# Patient Record
Sex: FEMALE | Race: WHITE | Employment: UNEMPLOYED | URBAN - METROPOLITAN AREA
[De-identification: names, ages, dates, MRNs, and addresses within clinical notes are randomized per-mention and may not be internally consistent; named-entity substitution may affect disease eponyms.]

---

## 2019-06-30 ENCOUNTER — APPOINTMENT (OUTPATIENT)
Dept: CT IMAGING | Age: 74
DRG: 176 | End: 2019-06-30
Attending: PHYSICIAN ASSISTANT
Payer: MEDICARE

## 2019-06-30 ENCOUNTER — HOSPITAL ENCOUNTER (INPATIENT)
Age: 74
LOS: 2 days | Discharge: HOME | DRG: 176 | End: 2019-07-02
Attending: EMERGENCY MEDICINE | Admitting: HOSPITALIST
Payer: MEDICARE

## 2019-06-30 ENCOUNTER — APPOINTMENT (OUTPATIENT)
Dept: GENERAL RADIOLOGY | Age: 74
DRG: 176 | End: 2019-06-30
Attending: EMERGENCY MEDICINE
Payer: MEDICARE

## 2019-06-30 DIAGNOSIS — R60.0 LOWER EXTREMITY EDEMA: ICD-10-CM

## 2019-06-30 DIAGNOSIS — I26.99 BILATERAL PULMONARY EMBOLISM (HCC): Primary | ICD-10-CM

## 2019-06-30 DIAGNOSIS — R07.9 CHEST PAIN, UNSPECIFIED TYPE: ICD-10-CM

## 2019-06-30 LAB
ALBUMIN SERPL-MCNC: 3.7 G/DL (ref 3.4–5)
ALBUMIN/GLOB SERPL: 1.3 {RATIO} (ref 0.8–1.7)
ALP SERPL-CCNC: 81 U/L (ref 45–117)
ALT SERPL-CCNC: 25 U/L (ref 13–56)
ANION GAP SERPL CALC-SCNC: 3 MMOL/L (ref 3–18)
AST SERPL-CCNC: 22 U/L (ref 15–37)
ATRIAL RATE: 64 BPM
BASOPHILS # BLD: 0 K/UL (ref 0–0.1)
BASOPHILS NFR BLD: 1 % (ref 0–2)
BILIRUB SERPL-MCNC: 0.3 MG/DL (ref 0.2–1)
BUN SERPL-MCNC: 25 MG/DL (ref 7–18)
BUN/CREAT SERPL: 24 (ref 12–20)
CALCIUM SERPL-MCNC: 9.2 MG/DL (ref 8.5–10.1)
CALCULATED P AXIS, ECG09: 62 DEGREES
CALCULATED R AXIS, ECG10: 39 DEGREES
CALCULATED T AXIS, ECG11: 38 DEGREES
CHLORIDE SERPL-SCNC: 110 MMOL/L (ref 100–108)
CK MB CFR SERPL CALC: 2.5 % (ref 0–4)
CK MB SERPL-MCNC: 1.8 NG/ML (ref 5–25)
CK SERPL-CCNC: 71 U/L (ref 26–192)
CO2 SERPL-SCNC: 30 MMOL/L (ref 21–32)
CREAT SERPL-MCNC: 1.04 MG/DL (ref 0.6–1.3)
D DIMER PPP FEU-MCNC: 2.1 UG/ML(FEU)
DIAGNOSIS, 93000: NORMAL
DIFFERENTIAL METHOD BLD: ABNORMAL
EOSINOPHIL # BLD: 0.2 K/UL (ref 0–0.4)
EOSINOPHIL NFR BLD: 4 % (ref 0–5)
ERYTHROCYTE [DISTWIDTH] IN BLOOD BY AUTOMATED COUNT: 14.2 % (ref 11.6–14.5)
GLOBULIN SER CALC-MCNC: 2.9 G/DL (ref 2–4)
GLUCOSE SERPL-MCNC: 96 MG/DL (ref 74–99)
HCT VFR BLD AUTO: 42.5 % (ref 35–45)
HGB BLD-MCNC: 13.7 G/DL (ref 12–16)
LYMPHOCYTES # BLD: 1.5 K/UL (ref 0.9–3.6)
LYMPHOCYTES NFR BLD: 25 % (ref 21–52)
MCH RBC QN AUTO: 30.2 PG (ref 24–34)
MCHC RBC AUTO-ENTMCNC: 32.2 G/DL (ref 31–37)
MCV RBC AUTO: 93.8 FL (ref 74–97)
MONOCYTES # BLD: 0.6 K/UL (ref 0.05–1.2)
MONOCYTES NFR BLD: 11 % (ref 3–10)
NEUTS SEG # BLD: 3.5 K/UL (ref 1.8–8)
NEUTS SEG NFR BLD: 59 % (ref 40–73)
P-R INTERVAL, ECG05: 138 MS
PLATELET # BLD AUTO: 191 K/UL (ref 135–420)
PMV BLD AUTO: 10.4 FL (ref 9.2–11.8)
POTASSIUM SERPL-SCNC: 4.4 MMOL/L (ref 3.5–5.5)
PROT SERPL-MCNC: 6.6 G/DL (ref 6.4–8.2)
Q-T INTERVAL, ECG07: 430 MS
QRS DURATION, ECG06: 88 MS
QTC CALCULATION (BEZET), ECG08: 443 MS
RBC # BLD AUTO: 4.53 M/UL (ref 4.2–5.3)
SODIUM SERPL-SCNC: 143 MMOL/L (ref 136–145)
TROPONIN I SERPL-MCNC: <0.02 NG/ML (ref 0–0.04)
VENTRICULAR RATE, ECG03: 64 BPM
WBC # BLD AUTO: 5.9 K/UL (ref 4.6–13.2)

## 2019-06-30 PROCEDURE — 71046 X-RAY EXAM CHEST 2 VIEWS: CPT

## 2019-06-30 PROCEDURE — 85379 FIBRIN DEGRADATION QUANT: CPT

## 2019-06-30 PROCEDURE — 74011250636 HC RX REV CODE- 250/636: Performed by: PHYSICIAN ASSISTANT

## 2019-06-30 PROCEDURE — 96372 THER/PROPH/DIAG INJ SC/IM: CPT

## 2019-06-30 PROCEDURE — 80053 COMPREHEN METABOLIC PANEL: CPT

## 2019-06-30 PROCEDURE — 85025 COMPLETE CBC W/AUTO DIFF WBC: CPT

## 2019-06-30 PROCEDURE — 93005 ELECTROCARDIOGRAM TRACING: CPT

## 2019-06-30 PROCEDURE — 82550 ASSAY OF CK (CPK): CPT

## 2019-06-30 PROCEDURE — 71275 CT ANGIOGRAPHY CHEST: CPT

## 2019-06-30 PROCEDURE — 99284 EMERGENCY DEPT VISIT MOD MDM: CPT

## 2019-06-30 PROCEDURE — 65660000000 HC RM CCU STEPDOWN

## 2019-06-30 PROCEDURE — 74011250637 HC RX REV CODE- 250/637: Performed by: PHYSICIAN ASSISTANT

## 2019-06-30 PROCEDURE — 74011636320 HC RX REV CODE- 636/320: Performed by: EMERGENCY MEDICINE

## 2019-06-30 RX ORDER — ENOXAPARIN SODIUM 150 MG/ML
90 INJECTION SUBCUTANEOUS EVERY 12 HOURS
Status: DISCONTINUED | OUTPATIENT
Start: 2019-06-30 | End: 2019-07-02 | Stop reason: HOSPADM

## 2019-06-30 RX ORDER — OMEPRAZOLE 40 MG/1
40 CAPSULE, DELAYED RELEASE ORAL DAILY
COMMUNITY

## 2019-06-30 RX ORDER — ASPIRIN 81 MG/1
81 TABLET ORAL DAILY
COMMUNITY

## 2019-06-30 RX ORDER — AZELASTINE HCL 205.5 UG/1
2 SPRAY NASAL AS NEEDED
COMMUNITY

## 2019-06-30 RX ORDER — GLUCOSAMINE/CHONDR SU A SOD 750-600 MG
TABLET ORAL DAILY
COMMUNITY

## 2019-06-30 RX ORDER — OMEPRAZOLE 20 MG/1
40 CAPSULE, DELAYED RELEASE ORAL DAILY
Status: DISCONTINUED | OUTPATIENT
Start: 2019-07-01 | End: 2019-07-02 | Stop reason: HOSPADM

## 2019-06-30 RX ORDER — LORATADINE 10 MG/1
10 TABLET ORAL DAILY
COMMUNITY

## 2019-06-30 RX ORDER — ASPIRIN 81 MG/1
81 TABLET ORAL DAILY
Status: DISCONTINUED | OUTPATIENT
Start: 2019-07-01 | End: 2019-07-02 | Stop reason: HOSPADM

## 2019-06-30 RX ORDER — LANOLIN ALCOHOL/MO/W.PET/CERES
1 CREAM (GRAM) TOPICAL 2 TIMES DAILY WITH MEALS
COMMUNITY

## 2019-06-30 RX ORDER — VENLAFAXINE 37.5 MG/1
37.5 TABLET ORAL DAILY
COMMUNITY

## 2019-06-30 RX ORDER — GUAIFENESIN 100 MG/5ML
324 LIQUID (ML) ORAL
Status: COMPLETED | OUTPATIENT
Start: 2019-06-30 | End: 2019-06-30

## 2019-06-30 RX ORDER — LORATADINE 10 MG/1
10 TABLET ORAL DAILY
Status: DISCONTINUED | OUTPATIENT
Start: 2019-07-01 | End: 2019-07-02 | Stop reason: HOSPADM

## 2019-06-30 RX ORDER — BISMUTH SUBSALICYLATE 262 MG
1 TABLET,CHEWABLE ORAL DAILY
COMMUNITY

## 2019-06-30 RX ADMIN — ENOXAPARIN SODIUM 90 MG: 120 INJECTION SUBCUTANEOUS at 21:22

## 2019-06-30 RX ADMIN — ASPIRIN 81 MG 324 MG: 81 TABLET ORAL at 18:25

## 2019-06-30 RX ADMIN — IOPAMIDOL 100 ML: 755 INJECTION, SOLUTION INTRAVENOUS at 20:24

## 2019-06-30 NOTE — ED TRIAGE NOTES
Pt states walking out at [employer] today with family, pt states she had sudden onset of chest pain, denies sob, diaphoresis, nausea , pt states duration approx 5-10 min. Pt states deep breath increased pain . Pt reports hx of same last 2 weeks ago. Pt denies pain at present no cardiac hx.

## 2019-06-30 NOTE — ED PROVIDER NOTES
EMERGENCY DEPARTMENT HISTORY AND PHYSICAL EXAM    Date: 6/30/2019  Patient Name: Vannessa Katz    History of Presenting Illness     Time Seen:6:01 PM      Chief Complaint   Patient presents with    Chest Pain       History Provided By: Patient    Additional History (Context): Vannessa Katz is a 75 y.o. female since emergency room visiting from [place]. Patient was done at [facility] walking around when she started to experience substernal chest discomfort that lasted for about 5 to 10 minutes. Dull ache in her center of her chest.  Nonradiating pain. She had no pain radiated into her neck or down her left arm. No associated shortness of breath or diaphoresis. No nausea. No abdominal pain. She is never had any issues with her heart or lungs. Denies any history of hypertension, diabetes, hyperlipidemia. No family history. Non-smoker. She had a similar incident happened about 2 weeks ago but this 1 lasted a little bit longer. Denies any indigestion or heartburn. She states that she took Zithromax a little over a month ago for an infection and since that time her taste and appetite has just not been the same. Patient is not having any chest pain at this time. No history of blood clots. She did go on some recent long car trips. PCP: UNKNOWN    Current Facility-Administered Medications   Medication Dose Route Frequency Provider Last Rate Last Dose    enoxaparin (LOVENOX) injection 90 mg  90 mg SubCUTAneous Q12H Steve Hairston PA   90 mg at 06/30/19 2122     Current Outpatient Medications   Medication Sig Dispense Refill    vit C/E/Zn/coppr/lutein/zeaxan (PRESERVISION AREDS-2 PO) Take  by mouth daily.  venlafaxine (EFFEXOR) 37.5 mg tablet Take 37.5 mg by mouth daily.  calcium citrate-vitamin d3 (CITRACAL+D) 315-200 mg-unit tab Take 1 Tab by mouth two (2) times daily (with meals).  multivitamin (ONE A DAY) tablet Take 1 Tab by mouth daily.       Biotin 2,500 mcg cap Take  by mouth daily.  aspirin delayed-release 81 mg tablet Take 81 mg by mouth daily.  vit B6-mag cit,oxid-potass cit (THERALITH XR) 3.75-45-45-49.5 mg TbER Take 1 Tab by mouth two (2) times a day.  omeprazole (PRILOSEC) 40 mg capsule Take 40 mg by mouth daily.  mirabegron ER (MYRBETRIQ) 50 mg ER tablet Take 50 mg by mouth daily.  loratadine (CLARITIN) 10 mg tablet Take 10 mg by mouth daily.  azelastine (ASTEPRO) 0.15 % (205.5 mcg) 2 Sprays by Both Nostrils route as needed.  fluticasone propionate 250 mcg/actuation dsdv Take  by inhalation as needed. Past History     Past Medical History:  Past Medical History:   Diagnosis Date    Acid reflux     Kidney stones        Past Surgical History:  Past Surgical History:   Procedure Laterality Date    HX COLONOSCOPY      HX LITHOTRIPSY      HX TONSILLECTOMY      HX TUBAL LIGATION         Family History:  History reviewed. No pertinent family history. Social History:  Social History     Tobacco Use    Smoking status: Never Smoker    Smokeless tobacco: Never Used   Substance Use Topics    Alcohol use: Not Currently    Drug use: Not Currently       Allergies: Allergies   Allergen Reactions    Compazine [Prochlorperazine] Other (comments)     Dystonic reaction     Declomycin [Demeclocycline] Hives    Doxycycline Hives    Penicillins Other (comments)     Not sure passed out      Sulfa (Sulfonamide Antibiotics) Unknown (comments)     As a child           Review of Systems   Review of Systems   Constitutional: Positive for appetite change. Negative for diaphoresis and fatigue. Respiratory: Negative for shortness of breath. Cardiovascular: Positive for chest pain. Negative for palpitations and leg swelling. Gastrointestinal: Negative for abdominal pain. Neurological: Negative for dizziness, syncope, weakness and light-headedness. All other systems reviewed and are negative.       Physical Exam     Vitals:    06/30/19 1742 06/30/19 1930   BP: 138/72    Pulse: 63 61   Resp: 16 20   Temp: 97.5 °F (36.4 °C)    SpO2: 99% 99%   Weight: 90.7 kg (200 lb)    Height: 5' 7\" (1.702 m)      Physical Exam   Constitutional: She is oriented to person, place, and time. Vital signs are normal. She appears well-developed and well-nourished. She is cooperative. She does not appear ill. No distress. Eyes: Pupils are equal, round, and reactive to light. Conjunctivae and EOM are normal.   Neck: Neck supple. No JVD present. No thyromegaly present. Cardiovascular: Normal rate, regular rhythm and normal heart sounds. Pulmonary/Chest: Effort normal. No respiratory distress. She has no wheezes. She has no rales. She exhibits no tenderness. Abdominal: Soft. Bowel sounds are normal. There is no tenderness. Musculoskeletal: She exhibits edema. Scant edema bilateral lower extremities. Minimal pitting. No palpable calf tenderness. Neurological: She is alert and oriented to person, place, and time. Skin: Skin is warm and dry. Psychiatric: She has a normal mood and affect. Nursing note reviewed. Nursing note and vitals reviewed         Diagnostic Study Results     Labs -     Recent Results (from the past 12 hour(s))   CBC WITH AUTOMATED DIFF    Collection Time: 06/30/19  5:30 PM   Result Value Ref Range    WBC 5.9 4.6 - 13.2 K/uL    RBC 4.53 4.20 - 5.30 M/uL    HGB 13.7 12.0 - 16.0 g/dL    HCT 42.5 35.0 - 45.0 %    MCV 93.8 74.0 - 97.0 FL    MCH 30.2 24.0 - 34.0 PG    MCHC 32.2 31.0 - 37.0 g/dL    RDW 14.2 11.6 - 14.5 %    PLATELET 287 187 - 106 K/uL    MPV 10.4 9.2 - 11.8 FL    NEUTROPHILS 59 40 - 73 %    LYMPHOCYTES 25 21 - 52 %    MONOCYTES 11 (H) 3 - 10 %    EOSINOPHILS 4 0 - 5 %    BASOPHILS 1 0 - 2 %    ABS. NEUTROPHILS 3.5 1.8 - 8.0 K/UL    ABS. LYMPHOCYTES 1.5 0.9 - 3.6 K/UL    ABS. MONOCYTES 0.6 0.05 - 1.2 K/UL    ABS. EOSINOPHILS 0.2 0.0 - 0.4 K/UL    ABS.  BASOPHILS 0.0 0.0 - 0.1 K/UL    DF AUTOMATED     CARDIAC PANEL,(CK, CKMB & TROPONIN)    Collection Time: 06/30/19  5:30 PM   Result Value Ref Range    CK 71 26 - 192 U/L    CK - MB 1.8 <3.6 ng/ml    CK-MB Index 2.5 0.0 - 4.0 %    Troponin-I, QT <0.02 0.0 - 2.268 NG/ML   METABOLIC PANEL, COMPREHENSIVE    Collection Time: 06/30/19  5:30 PM   Result Value Ref Range    Sodium 143 136 - 145 mmol/L    Potassium 4.4 3.5 - 5.5 mmol/L    Chloride 110 (H) 100 - 108 mmol/L    CO2 30 21 - 32 mmol/L    Anion gap 3 3.0 - 18 mmol/L    Glucose 96 74 - 99 mg/dL    BUN 25 (H) 7.0 - 18 MG/DL    Creatinine 1.04 0.6 - 1.3 MG/DL    BUN/Creatinine ratio 24 (H) 12 - 20      GFR est AA >60 >60 ml/min/1.73m2    GFR est non-AA 52 (L) >60 ml/min/1.73m2    Calcium 9.2 8.5 - 10.1 MG/DL    Bilirubin, total 0.3 0.2 - 1.0 MG/DL    ALT (SGPT) 25 13 - 56 U/L    AST (SGOT) 22 15 - 37 U/L    Alk. phosphatase 81 45 - 117 U/L    Protein, total 6.6 6.4 - 8.2 g/dL    Albumin 3.7 3.4 - 5.0 g/dL    Globulin 2.9 2.0 - 4.0 g/dL    A-G Ratio 1.3 0.8 - 1.7     D DIMER    Collection Time: 06/30/19  5:30 PM   Result Value Ref Range    D DIMER 2.10 (H) <0.46 ug/ml(FEU)   EKG, 12 LEAD, INITIAL    Collection Time: 06/30/19  5:51 PM   Result Value Ref Range    Ventricular Rate 64 BPM    Atrial Rate 64 BPM    P-R Interval 138 ms    QRS Duration 88 ms    Q-T Interval 430 ms    QTC Calculation (Bezet) 443 ms    Calculated P Axis 62 degrees    Calculated R Axis 39 degrees    Calculated T Axis 38 degrees    Diagnosis       Sinus rhythm with premature atrial complexes  Otherwise normal ECG  Confirmed by Lincoln Pérez MD, Mary Anderson ([id number]) on 6/30/2019 6:04:03 PM         Radiologic Studies   CTA CHEST W OR W WO CONT   Final Result   IMPRESSION:      There are small bilateral lower lobe and right upper lobe pulmonary emboli. There is no right heart strain. Dr. Jen Stevenson informed 9:09 PM June 30, 2019. No aortic dissection. Small hiatal hernia. Large right thyroid lobe nodule. Advise thyroid ultrasound on a nonemergent   basis.       Less than 5 mm multiple pulmonary nodules. Follow-up as per Fleischner Society   protocol.      ========      Fleischner Society Pulmonary Nodule Guidelines (revised 2017): Multiple solid nodules less than 6 mm:   -Low risk for lung cancer: No followup.   -High risk for lung cancer: Optional chest CT in 12 months. XR CHEST PA LAT    (Results Pending)     CT Results  (Last 48 hours)               06/30/19 2036  CTA CHEST W OR W WO CONT Final result    Impression:  IMPRESSION:       There are small bilateral lower lobe and right upper lobe pulmonary emboli. There is no right heart strain. Dr. Mahi Foreman informed 9:09 PM June 30, 2019. No aortic dissection. Small hiatal hernia. Large right thyroid lobe nodule. Advise thyroid ultrasound on a nonemergent   basis. Less than 5 mm multiple pulmonary nodules. Follow-up as per Fleischner Society   protocol.       ========       Fleischner Society Pulmonary Nodule Guidelines (revised 2017): Multiple solid nodules less than 6 mm:   -Low risk for lung cancer: No followup.   -High risk for lung cancer: Optional chest CT in 12 months. Narrative:  EXAM: CTA chest       INDICATION: Chest pain       COMPARISON: None       TECHNIQUE: Axial CT imaging from the thoracic inlet through the diaphragm with   intravenous contrast. Coronal and sagittal MIP reformats were generated. One or   more dose reduction techniques were used on this CT: automated exposure control,   adjustment of the mAs and/or kVp according to patient size, and iterative   reconstruction techniques. The specific techniques used on this CT exam have   been documented in the patient's electronic medical record.  Digital Imaging and   Communications in Medicine (DICOM) format image data are available to   nonaffiliated external healthcare facilities or entities on a secure, media   free, reciprocally searchable basis with patient authorization for at least a 12-month period after this study. _______________       FINDINGS:       EXAM QUALITY: Overall exam quality is satisfactory. Pulmonary arterial   enhancement is adequate with adequate breath hold and no significant artifact. PULMONARY ARTERIES: There are small bilateral lower lobe pulmonary emboli to the   subsegmental pulmonary arteries. Is also small pulmonary emboli to the right   upper lobe pulmonary arteries. LYMPH Nodes: No enlarged lymph nodes seen. THYROID GLAND: There is a very large right thyroid lobe nodule measuring 5.4 x   3.1 cm. Advise thyroid ultrasound for further evaluation. It is causing mass   effect on the trachea. PLEURA: No pleural effusion seen       HEART: Normal without pericardial effusion       VASCULATURE/MEDIASTINUM: There is a moderate size hiatal hernia. There is no   aortic dissection. There is no right heart strain. Right heart strain indices:   RV/LV ratio: Normal (ratio </= 1). Interventricular septal bowing: None. Main pulmonary artery diameter: Normal.   Contrast reflux into the IVC: None. LUNGS: There is a 2 mm nodule in the left upper lobe on image 23. There is a 2   mm nodule left upper lobe image 22. There is a 1 to 2 mm nodule left lower lobe   image 113. There is a 2 mm nodule right upper lobe image 88. No focal airspace   disease seen. AIRWAY: Normal.       UPPER ABDOMEN: There is a nonobstructive calculus in the upper pole the left   kidney measuring 3 mm. Otherwise the visualized solid organs are unremarkable. Moderate size hiatal hernia present. OTHER: No acute or aggressive osseous abnormalities identified. There is   osteopenia and multilevel degenerative disc disease.       _______________               CXR Results  (Last 48 hours)    None            Medical Decision Making   I am the first provider for this patient.     I reviewed the vital signs, available nursing notes, past medical history, past surgical history, family history and social history. Vital Signs-Reviewed the patient's vital signs. Pulse Oximetry Analysis 99% on room air    Records Reviewed: Nursing Notes    DDX: Coronary artery disease, GERD, muscular skeletal, pulmonary/pulmonary embolism    Provider Notes:   75 y.o. female resents emergency room with complaints of chest pain pressure that has now occurred for the second time in 2 weeks. She is here visiting from out of town. Labs x-rays were ordered to better evaluate. EKG as well. EKG showed normal sinus rhythm. No acute changes. Chest x-ray was negative. Laboratory work all came back looking normal except for an elevated d-dimer. For this reason, CTA of the chest was ordered to rule out pulmonary embolism. CTA came back positive for bilateral PEs. Patient was informed of these results. She will be initiated on Lovenox. Phone consultation was placed to Dr. Heraclio Garrett, hospitalist.  He was advised of the patient's condition and has agreed to admit the patient to the hospital for further evaluation and treatment. Procedures:  Procedures    ED Course:   Initial assessment performed. The patients presenting problems have been discussed, and they are in agreement with the care plan formulated and outlined with them. I have encouraged them to ask questions as they arise throughout their visit. Diagnosis and Disposition       Critical Care Time: 30 minutes    Core Measures:  For Hospitalized Patients:    1. Hospitalization Decision Time:  The decision to hospitalize the patient was made by Marcel Lozoya at 9:30 PM   on 6/30/2019    2. Aspirin: Aspirin was given    9:29 PM  Patient is [facility] [address] [place] [address]. The results of their tests and reasons for their admission have been discussed with them and/or available family. They convey agreement and understanding for the need to be admitted and for their admission diagnosis.       CONDITIONS ON ADMISSION:  Sepsis is not present at the time of admission. Deep Vein Thrombosis is not present at the time of admission. Thrombosis is present at the time of admission. Urinary Tract Infection is not present at the time of admission. Pneumonia is not present at the time of admission. MRSA is not present at the time of admission. Wound infection   Is not present at the time of admission. Pressure Ulcer is not present at the time of admission. CLINICAL IMPRESSION:    1. Bilateral pulmonary embolism (HCC)    2. Chest pain, unspecified type    3. Lower extremity edema      PLAN:  1. Admit to Telemetry - Dr. Amol Keys    Please note that this dictation was completed with [employer], the computer voice recognition software. Quite often unanticipated grammatical, syntax, homophones, and other interpretive errors are inadvertently transcribed by the computer software. Please disregard these errors. Please excuse any errors that have escaped final proofreading.

## 2019-07-01 ENCOUNTER — APPOINTMENT (OUTPATIENT)
Dept: VASCULAR SURGERY | Age: 74
DRG: 176 | End: 2019-07-01
Attending: HOSPITALIST
Payer: MEDICARE

## 2019-07-01 ENCOUNTER — APPOINTMENT (OUTPATIENT)
Dept: NON INVASIVE DIAGNOSTICS | Age: 74
DRG: 176 | End: 2019-07-01
Attending: HOSPITALIST
Payer: MEDICARE

## 2019-07-01 LAB
ANION GAP SERPL CALC-SCNC: 5 MMOL/L (ref 3–18)
BUN SERPL-MCNC: 21 MG/DL (ref 7–18)
BUN/CREAT SERPL: 23 (ref 12–20)
CALCIUM SERPL-MCNC: 8.7 MG/DL (ref 8.5–10.1)
CHLORIDE SERPL-SCNC: 108 MMOL/L (ref 100–108)
CO2 SERPL-SCNC: 28 MMOL/L (ref 21–32)
CREAT SERPL-MCNC: 0.92 MG/DL (ref 0.6–1.3)
ECHO AO ASC DIAM: 2.95 CM
ECHO AO ROOT DIAM: 2.78 CM
ECHO AV MEAN GRADIENT: 6 MMHG
ECHO AV PEAK GRADIENT: 9.6 MMHG
ECHO AV PEAK VELOCITY: 154.95 CM/S
ECHO AV VTI: 33.88 CM
ECHO LA MAJOR AXIS: 4.03 CM
ECHO LA VOL 2C: 71.66 ML (ref 22–52)
ECHO LA VOL 4C: 48.46 ML (ref 22–52)
ECHO LA VOL BP: 68.35 ML (ref 22–52)
ECHO LA VOL/BSA BIPLANE: 33.8 ML/M2 (ref 16–28)
ECHO LA VOLUME INDEX A2C: 35.44 ML/M2 (ref 16–28)
ECHO LA VOLUME INDEX A4C: 23.97 ML/M2 (ref 16–28)
ECHO LV E' LATERAL VELOCITY: 7 CM/S
ECHO LV E' SEPTAL VELOCITY: 7 CM/S
ECHO LV EDV A2C: 69.8 ML
ECHO LV EDV A4C: 84.2 ML
ECHO LV EDV BP: 85.3 ML (ref 56–104)
ECHO LV EDV INDEX A4C: 41.6 ML/M2
ECHO LV EDV INDEX BP: 42.2 ML/M2
ECHO LV EDV NDEX A2C: 34.5 ML/M2
ECHO LV EJECTION FRACTION A2C: 63 %
ECHO LV EJECTION FRACTION A4C: 58 %
ECHO LV EJECTION FRACTION BIPLANE: 61.4 % (ref 55–100)
ECHO LV ESV A2C: 25.6 ML
ECHO LV ESV A4C: 35.7 ML
ECHO LV ESV BP: 32.9 ML (ref 19–49)
ECHO LV ESV INDEX A2C: 12.7 ML/M2
ECHO LV ESV INDEX A4C: 17.7 ML/M2
ECHO LV ESV INDEX BP: 16.3 ML/M2
ECHO LV INTERNAL DIMENSION DIASTOLIC: 4.14 CM (ref 3.9–5.3)
ECHO LV INTERNAL DIMENSION SYSTOLIC: 2.57 CM
ECHO LV IVSD: 1.19 CM (ref 0.6–0.9)
ECHO LV MASS 2D: 172.1 G (ref 67–162)
ECHO LV MASS INDEX 2D: 85.1 G/M2 (ref 43–95)
ECHO LV POSTERIOR WALL DIASTOLIC: 0.97 CM (ref 0.6–0.9)
ECHO LVOT DIAM: 1.92 CM
ECHO MV A VELOCITY: 107.18 CM/S
ECHO MV AREA PHT: 3.2 CM2
ECHO MV E DECELERATION TIME (DT): 236.9 MS
ECHO MV E VELOCITY: 91.74 CM/S
ECHO MV E/A RATIO: 0.86
ECHO MV E/E' LATERAL: 13.11
ECHO MV E/E' RATIO (AVERAGED): 13.11
ECHO MV E/E' SEPTAL: 13.11
ECHO MV PRESSURE HALF TIME (PHT): 68.7 MS
ECHO PV MAX VELOCITY: 115.36 CM/S
ECHO PV PEAK GRADIENT: 5.3 MMHG
ECHO RA AREA 4C: 16.05 CM2
ECHO RV INTERNAL DIMENSION: 3.53 CM
ECHO TRICUSPID ANNULAR PEAK SYSTOLIC VELOCITY: 2 CM/S
ECHO TV REGURGITANT MAX VELOCITY: 284.43 CM/S
ECHO TV REGURGITANT PEAK GRADIENT: 32.4 MMHG
ERYTHROCYTE [DISTWIDTH] IN BLOOD BY AUTOMATED COUNT: 14 % (ref 11.6–14.5)
GLUCOSE SERPL-MCNC: 113 MG/DL (ref 74–99)
HCT VFR BLD AUTO: 42 % (ref 35–45)
HGB BLD-MCNC: 13.3 G/DL (ref 12–16)
MCH RBC QN AUTO: 29.6 PG (ref 24–34)
MCHC RBC AUTO-ENTMCNC: 31.7 G/DL (ref 31–37)
MCV RBC AUTO: 93.5 FL (ref 74–97)
PLATELET # BLD AUTO: 189 K/UL (ref 135–420)
PMV BLD AUTO: 10.2 FL (ref 9.2–11.8)
POTASSIUM SERPL-SCNC: 3.8 MMOL/L (ref 3.5–5.5)
RBC # BLD AUTO: 4.49 M/UL (ref 4.2–5.3)
SODIUM SERPL-SCNC: 141 MMOL/L (ref 136–145)
WBC # BLD AUTO: 5.3 K/UL (ref 4.6–13.2)

## 2019-07-01 PROCEDURE — 74011250637 HC RX REV CODE- 250/637: Performed by: HOSPITALIST

## 2019-07-01 PROCEDURE — 80048 BASIC METABOLIC PNL TOTAL CA: CPT

## 2019-07-01 PROCEDURE — 97535 SELF CARE MNGMENT TRAINING: CPT

## 2019-07-01 PROCEDURE — 36415 COLL VENOUS BLD VENIPUNCTURE: CPT

## 2019-07-01 PROCEDURE — 97116 GAIT TRAINING THERAPY: CPT

## 2019-07-01 PROCEDURE — 85027 COMPLETE CBC AUTOMATED: CPT

## 2019-07-01 PROCEDURE — 97161 PT EVAL LOW COMPLEX 20 MIN: CPT

## 2019-07-01 PROCEDURE — 74011250636 HC RX REV CODE- 250/636: Performed by: HOSPITALIST

## 2019-07-01 PROCEDURE — 93970 EXTREMITY STUDY: CPT

## 2019-07-01 PROCEDURE — 97166 OT EVAL MOD COMPLEX 45 MIN: CPT

## 2019-07-01 PROCEDURE — 65660000000 HC RM CCU STEPDOWN

## 2019-07-01 PROCEDURE — C8929 TTE W OR WO FOL WCON,DOPPLER: HCPCS

## 2019-07-01 PROCEDURE — 74011250636 HC RX REV CODE- 250/636: Performed by: PHYSICIAN ASSISTANT

## 2019-07-01 RX ADMIN — ENOXAPARIN SODIUM 90 MG: 120 INJECTION SUBCUTANEOUS at 10:20

## 2019-07-01 RX ADMIN — ENOXAPARIN SODIUM 90 MG: 120 INJECTION SUBCUTANEOUS at 21:44

## 2019-07-01 RX ADMIN — ASPIRIN 81 MG: 81 TABLET, COATED ORAL at 20:00

## 2019-07-01 RX ADMIN — PERFLUTREN 1 ML: 6.52 INJECTION, SUSPENSION INTRAVENOUS at 10:13

## 2019-07-01 RX ADMIN — OMEPRAZOLE 40 MG: 20 CAPSULE, DELAYED RELEASE ORAL at 17:41

## 2019-07-01 RX ADMIN — MIRABEGRON 50 MG: 25 TABLET, FILM COATED, EXTENDED RELEASE ORAL at 20:02

## 2019-07-01 NOTE — ED NOTES
TRANSFER - OUT REPORT:    Verbal report given to Elina Peace RN (name) on Daisy Hernandez  being transferred to tele (unit) for routine progression of care       Report consisted of patients Situation, Background, Assessment and   Recommendations(SBAR). Information from the following report(s) SBAR, Kardex, ED Summary, Procedure Summary and Intake/Output was reviewed with the receiving nurse. Lines:   Peripheral IV 06/30/19 Left Antecubital (Active)        Opportunity for questions and clarification was provided.       Patient transported with:   Monitor

## 2019-07-01 NOTE — PROGRESS NOTES
Problem: Self Care Deficits Care Plan (Adult)  Goal: *Acute Goals and Plan of Care (Insert Text)  Description  Initial Occupational Therapy Goals (7/1/2019) Within 7 day(s):    1. Patient will perform grooming standing at sink with Supervision x 2-3 minutes for increased independence with ADLs. 2. Patient will perform UB dressing with setup for increased independence with ADLs. 3. Patient will perform LB dressing with setup for increased independence with ADLs. 4. Patient will perform all aspects of toileting with Supervision for increased independence in ADLs  5. Patient will independently apply energy conservation techniques with 1 verbal cue(s) for increased independence with ADLs. 6. Patient will utilize good body mechanics during ADLs with 1 verbal cue(s). Outcome: Resolved/Met     OCCUPATIONAL THERAPY EVALUATION/DISCHARGE    Patient: Dionne Garcia (38 y.o. female)  Date: 7/1/2019  Primary Diagnosis: Bilateral pulmonary embolism ([facility] [address] [I26.99]  Chest pain [R07.9]        Precautions: blood thinners; PE     PLOF: Pt was independent; pt drove w/ spouse and to take grandsons to this area to visit [address] [place] [address], etc. Pt lives outside of [facility]:  Based on the objective data described below, the patient presents with near baseline. SPO2 maintained w/ mild decrease during LE dressing w/ quick recovery from 88% to 94% (within ~<30 seconds). Education on frequent stops on travels and use of compression vs. Ankle pumps in car. Education: Patient instructed on home safety, body mechanics for optimal respiratory effort, Energy Conservation/Work Simplification Techniques, adaptive strategies and adaptive dressing techniques including clothing modifications with patient verbalizing understanding at this time. Skilled Occupational Therapy is not indicated at this time.   Discharge Recommendations: None  Further Equipment Recommendations for Discharge: N/A SUBJECTIVE:   Patient stated ? Should I stop and visit sites along the way? .?    OBJECTIVE DATA SUMMARY:     Past Medical History:   Diagnosis Date    Acid reflux     Kidney stones      Past Surgical History:   Procedure Laterality Date    HX COLONOSCOPY      HX LITHOTRIPSY      HX TONSILLECTOMY      HX TUBAL LIGATION       Barriers to Learning/Limitations: yes;  physical  Compensate with: visual, verbal, tactile, kinesthetic cues/model    Home Situation: Supportive spouse  Home Situation  Home Environment: Private residence  Wheelchair Ramp: Yes  One/Two Story Residence: Two story  # of Interior Steps: 12(with landing midway)  Interior Rails: Left(ascending)  Living Alone: No  Support Systems: Spouse/Significant Other/Partner  Patient Expects to be Discharged to[de-identified] Private residence  Current DME Used/Available at Home: None  Tub or Shower Type: Tub/Shower combination  ? Right hand dominant   ? Left hand dominant    Cognitive/Behavioral Status:  Neurologic State: Alert; Appropriate for age  Orientation Level: Appropriate for age;Oriented X4  Cognition: Appropriate decision making; Appropriate for age attention/concentration; Appropriate safety awareness; Follows commands  Safety/Judgement: Awareness of environment;Driving appropriateness; Insight into deficits    Skin: appears grossly intact   Edema: No significant edema noted     Vision/Perceptual:    WFL   Corrective Lenses: Reading glasses    Coordination: BUE  Coordination: Within functional limits  Fine Motor Skills-Upper: Left Intact; Right Intact    Gross Motor Skills-Upper: Left Intact; Right Intact    Balance:  Sitting: Intact  Standing: Intact; Without support    Strength: BUE  Strength:  Within functional limits    Tone & Sensation: BUE  Tone: Normal  Sensation: Intact    Range of Motion: BUE  AROM: Within functional limits    Functional Mobility and Transfers for ADLs:  Bed Mobility:   Independent  Transfers:  Sit to Stand: Independent  Bed to Chair: Independent   Toilet Transfer : Independent   Bathroom Mobility: Independent    ADL Assessment:  Feeding: Independent    Oral Facial Hygiene/Grooming: Independent    Bathing: Independent    Upper Body Dressing: Independent    Lower Body Dressing: Independent    Toileting: Independent    ADL Intervention:  Grooming  Washing Hands: Independent    Lower Body Dressing Assistance  Socks: Independent  Pt reports Knee ROM deficits affecting LE sock frances/doffing, but able to complete    Toileting  Toileting Assistance: Independent  Bladder Hygiene: Independent  Clothing Management: Independent    Cognitive Retraining  Safety/Judgement: Awareness of environment;Driving appropriateness; Insight into deficits    Pain:  Pain level pre-treatment: 0/10   Pain level post-treatment: 0/10   Pain Intervention(s): Medication provided by Nursing (see MAR); Rest, Ice, Repositioning   Response to intervention: Nurse notified, See doc flow sheet    Activity Tolerance:   Fair. Patient able to stand 2-3 minute(s). Patient able to complete ADLs with intermittent rest breaks. Patient limited by functional endurance, pulmonary status. Please refer to the flowsheet for vital signs taken during this treatment. After treatment:   ?  Patient left in no apparent distress sitting up in chair  ? Patient left in no apparent distress in bed/sitting EOB  ? Call bell left within reach  ? Nursing notified  ? Caregiver present/PT/Jese  ? Bed alarm activated    COMMUNICATION/EDUCATION:   ?      Role of Occupational Therapy in the acute care setting  ? Home safety education was provided and the patient/caregiver indicated understanding. ? Patient/family have participated as able and agree with findings and recommendations. ?      Patient is unable to participate in plan of care at this time.     Thank you for this referral.  Jorge Evangelist  Time Calculation: 29 mins      Eval Complexity: History: HIGH Complexity : Extensive review of history including physical, cognitive and psychosocial history ; Examination: MEDIUM Complexity : 3-5 performance deficits relating to physical, cognitive , or psychosocial skils that result in activity limitations and / or participation restrictions; Decision Making:MEDIUM Complexity : Patient may present with comorbidities that affect occupational performnce.  Miniml to moderate modification of tasks or assistance (eg, physical or verbal ) with assesment(s) is necessary to enable patient to complete evaluation

## 2019-07-01 NOTE — PROGRESS NOTES
Problem: Falls - Risk of  Goal: *Absence of Falls  Description  Document Nelson Boeck Fall Risk and appropriate interventions in the flowsheet.   Outcome: Progressing Towards Goal     Problem: Patient Education: Go to Patient Education Activity  Goal: Patient/Family Education  Outcome: Progressing Towards Goal     Problem: Patient Education: Go to Patient Education Activity  Goal: Patient/Family Education  Outcome: Progressing Towards Goal     Problem: Patient Education: Go to Patient Education Activity  Goal: Patient/Family Education  Outcome: Progressing Towards Goal

## 2019-07-01 NOTE — ED NOTES
Attempted to call report to receiving RN. RN unable to take report at this time will re attempted to call report shortly.

## 2019-07-01 NOTE — PROGRESS NOTES
0730 Assumed responsibility for patient from Kecia Vides RN    56 Patient assessment completed. Patient stated she had already taken her meds from home. Cautioned patient that she should not take home meds while in hospital as they cannot be documented and could cause duplicate dosing to happen. Patient understood. Marlen Harris aware. Bedside shift change report given to Ryan Smith RN (oncoming nurse) by Hortensia Nichols RN (offgoing nurse). Report included the following information SBAR, Kardex and MAR.

## 2019-07-01 NOTE — PROGRESS NOTES
Bedside shift change report given to Cherie Echavarria (oncoming nurse) by Ravinder Doan   (offgoing nurse). Report included the following information SBAR, Kardex, Intake/Output and MAR.

## 2019-07-01 NOTE — H&P
History & Physical    Patient: Mariam Enriquez MRN: [id number]  CSN: [id number]    YOB: 1945  Age: 75 y.o. Sex: female      DOA: 6/30/2019  Primary Care Provider:  UNKNOWN      Assessment/Plan     Patient Active Problem List   Diagnosis Code    Chest pain R07.9    Bilateral pulmonary embolism ([facility] [address] I26.99       Admit to medical floor. Bilateral pulmonary embolism - on lovenox therapeutic dose, will check LE venous dopplers and echo to look for right heart strain. Discussed with patient of choice of anticoagulation, discussed coumadin vs NOAC. She would like to use NOAC. Will continue with PPI for GERD. Estimated length of stay : 1-2 days. CC: chest pain       HPI:     Mariam Enriquez is a 75 y.o. female who has no significant past history except for kidney stones, presents to ER with concerns of chest pain. Patient reports that she was walking at [facility] when she felt chest pain that was sharp located center of chest. Not associated with SOB or diaphoresis. It lasted about 5 mins. Denies any syncopal episode, leg swelling or pain. She is visiting this area and she came from [place], [place]. She had long 7-8 hours drive 4 days ago. She reports that 2 weeks ago she drove to [place], [place] and at that time she had similar chest pain but did not seek medical attention. In ER CT chest with bilateral PE, no right heart strain. Past Medical History:   Diagnosis Date    Acid reflux     Kidney stones        Past Surgical History:   Procedure Laterality Date    HX COLONOSCOPY      HX LITHOTRIPSY      HX TONSILLECTOMY      HX TUBAL LIGATION         History reviewed. No pertinent family history.     Social History     Socioeconomic History    Marital status:      Spouse name: Not on file    Number of children: Not on file    Years of education: Not on file    Highest education level: Not on file   Tobacco Use    Smoking status: Never Smoker    Smokeless tobacco: Never Used   Substance and Sexual Activity    Alcohol use: Not Currently    Drug use: Not Currently       Prior to Admission medications    Medication Sig Start Date End Date Taking? Authorizing Provider   vit C/E/Zn/coppr/lutein/zeaxan (PRESERVISION AREDS-2 PO) Take  by mouth daily. Yes Other, MD Yulia   venlafaxine (EFFEXOR) 37.5 mg tablet Take 37.5 mg by mouth daily. Yes Amadeo, MD Yulia   calcium citrate-vitamin d3 (CITRACAL+D) 315-200 mg-unit tab Take 1 Tab by mouth two (2) times daily (with meals). Yes Other, MD Yulia   multivitamin (ONE A DAY) tablet Take 1 Tab by mouth daily. Yes Amadeo, MD Yulia   Biotin 2,500 mcg cap Take  by mouth daily. Yes Amadeo, MD Yulia   aspirin delayed-release 81 mg tablet Take 81 mg by mouth daily. Yes Amadeo, MD Yulia   vit B6-mag cit,oxid-potass cit (THERALITH XR) 3.75-45-45-49.5 mg TbER Take 1 Tab by mouth two (2) times a day. Yes Amadeo, MD Yulia   omeprazole (PRILOSEC) 40 mg capsule Take 40 mg by mouth daily. Yes Amadeo, MD Yulia   mirabegron ER (MYRBETRIQ) 50 mg ER tablet Take 50 mg by mouth daily. Yes Amadeo, MD Yulia   loratadine (CLARITIN) 10 mg tablet Take 10 mg by mouth daily. Yes Amadeo, MD Yulia   azelastine (ASTEPRO) 0.15 % (205.5 mcg) 2 Sprays by Both Nostrils route as needed. Yes Amadeo, MD Yulia   fluticasone propionate 250 mcg/actuation dsdv Take  by inhalation as needed. Yes Other, MD Yulia       Allergies   Allergen Reactions    Compazine [Prochlorperazine] Other (comments)     Dystonic reaction     Declomycin [Demeclocycline] Hives    Doxycycline Hives    Penicillins Other (comments)     Not sure passed out      Sulfa (Sulfonamide Antibiotics) Unknown (comments)     As a child         Review of Systems  Gen: No fever, chills, malaise, weight loss/gain. Heent: No headache, rhinorrhea, epistaxis, ear pain, hearing loss, sinus pain, neck pain/stiffness, sore throat.    Heart: see above   Resp: No cough, hemoptysis, wheezing and shortness of breath. GI: No nausea, vomiting, diarrhea, constipation, melena or hematochezia. : No urinary obstruction, dysuria or hematuria. Derm: No rash, new skin lesion or pruritis. Musc/skeletal: no bone or joint complains. Vasc: No edema, cyanosis or claudication. Endo: No heat/cold intolerance, no polyuria,polydipsia or polyphagia. Neuro: No unilateral weakness, numbness, tingling. No seizures. Heme: No easy bruising or bleeding. Physical Exam:     Physical Exam:  Visit Vitals  /67 (BP 1 Location: Left arm)   Pulse 62   Temp 98 °F (36.7 °C)   Resp 14   Ht 5' 7\" (1.702 m)   Wt 90.7 kg (200 lb)   LMP  (LMP Unknown)   SpO2 100%   BMI 31.32 kg/m²      O2 Device: Room air    Temp (24hrs), Av.8 °F (36.6 °C), Min:97.5 °F (36.4 °C), Max:98 °F (36.7 °C)    No intake/output data recorded. No intake/output data recorded. General:  Awake, cooperative, no distress. Head:  Normocephalic, without obvious abnormality, atraumatic. Eyes:  Conjunctivae/corneas clear, sclera anicteric, PERRL, EOMs intact. Nose: Nares normal. No drainage or sinus tenderness. Throat: Lips, mucosa, and tongue normal.    Neck: Supple, symmetrical, trachea midline, no adenopathy. Lungs:   Clear to auscultation bilaterally. Heart:   S1, S2, no murmur, click, rub or gallop. Abdomen: Soft, non-tender. Bowel sounds normal. No masses,  No organomegaly. Extremities: Extremities normal, atraumatic, no cyanosis or edema. Capillary refill normal.   Pulses: 2+ and symmetric all extremities. Skin: Skin color pink, turgor normal. No rashes or lesions   Neurologic: CNII-XII intact. No focal motor or sensory deficit.        Labs Reviewed:    CMP:   Lab Results   Component Value Date/Time     2019 05:30 PM    K 4.4 2019 05:30 PM     (H) 2019 05:30 PM    CO2 30 2019 05:30 PM    AGAP 3 2019 05:30 PM    GLU 96 2019 05:30 PM    BUN 25 (H) 2019 05:30 PM    CREA 1.04 06/30/2019 05:30 PM    GFRAA >60 06/30/2019 05:30 PM    GFRNA 52 (L) 06/30/2019 05:30 PM    CA 9.2 06/30/2019 05:30 PM    ALB 3.7 06/30/2019 05:30 PM    TP 6.6 06/30/2019 05:30 PM    GLOB 2.9 06/30/2019 05:30 PM    AGRAT 1.3 06/30/2019 05:30 PM    SGOT 22 06/30/2019 05:30 PM    ALT 25 06/30/2019 05:30 PM     CBC:   Lab Results   Component Value Date/Time    WBC 5.9 06/30/2019 05:30 PM    HGB 13.7 06/30/2019 05:30 PM    HCT 42.5 06/30/2019 05:30 PM     06/30/2019 05:30 PM         Procedures/imaging: see electronic medical records for all procedures/Xrays and details which were not copied into this note but were reviewed prior to creation of Plan        CC: UNKNOWN

## 2019-07-01 NOTE — PROGRESS NOTES
Hospitalist Progress Note    Patient: John Romero MRN: [id number]  CSN: [id number]    YOB: 1945  Age: 75 y.o. Sex: female    DOA: 6/30/2019 LOS:  LOS: 1 day                Assessment/Plan     Patient Active Problem List   Diagnosis Code    Chest pain R07.9    Bilateral pulmonary embolism ([facility] [address] I26.99            76 yo female admitted for PE    Bilateral pulmonary embolism - on lovenox therapeutic dose, follow LE venous dopplers and echo to look for right heart strain. Discussed with patient of choice of anticoagulation, discussed coumadin vs NOAC. She would like to use NOAC.      Will continue with PPI for GERD. Disposition : 1-2 days    Review of systems  General: No fevers or chills. Cardiovascular: No chest pain or pressure. No palpitations. Pulmonary: No shortness of breath. Gastrointestinal: No nausea, vomiting. Physical Exam:  General: Awake, cooperative, no acute distress    HEENT: NC, Atraumatic. PERRLA, anicteric sclerae. Lungs: CTA Bilaterally. No Wheezing/Rhonchi/Rales. Heart:  S1 S2,  No murmur, No Rubs, No Gallops  Abdomen: Soft, Non distended, Non tender.  +Bowel sounds,   Extremities: No c/c/e  Psych:   Not anxious or agitated. Neurologic:  No acute neurological deficit. Vital signs/Intake and Output:  Visit Vitals  /61   Pulse 68   Temp 97.4 °F (36.3 °C)   Resp 14   Ht 5' 7\" (1.702 m)   Wt 89.7 kg (197 lb 12.8 oz)   SpO2 100%   BMI 30.98 kg/m²     Current Shift:  No intake/output data recorded. Last three shifts:  No intake/output data recorded.             Labs: Results:       Chemistry Recent Labs     07/01/19  0106 06/30/19  1730   * 96    143   K 3.8 4.4    110*   CO2 28 30   BUN 21* 25*   CREA 0.92 1.04   CA 8.7 9.2   AGAP 5 3   BUCR 23* 24*   AP  --  81   TP  --  6.6   ALB  --  3.7   GLOB  --  2.9   AGRAT  --  1.3      CBC w/Diff Recent Labs     07/01/19  0106 06/30/19  1730   WBC 5.3 5.9   RBC 4.49 4.53   HGB 13.3 13.7   HCT 42.0 42.5    191   GRANS  --  59   LYMPH  --  25   EOS  --  4      Cardiac Enzymes Recent Labs     06/30/19  1730   CPK 71   CKND1 2.5      Coagulation No results for input(s): PTP, INR, APTT in the last 72 hours. No lab exists for component: INREXT    Lipid Panel No results found for: CHOL, CHOLPOCT, CHOLX, CHLST, CHOLV, 719084, HDL, LDL, LDLC, DLDLP, 926948, VLDLC, VLDL, TGLX, TRIGL, TRIGP, TGLPOCT, CHHD, CHHDX   BNP No results for input(s): BNPP in the last 72 hours.    Liver Enzymes Recent Labs     06/30/19  1730   TP 6.6   ALB 3.7   AP 81   SGOT 22      Thyroid Studies No results found for: T4, T3U, TSH, TSHEXT     Procedures/imaging: see electronic medical records for all procedures/Xrays and details which were not copied into this note but were reviewed prior to creation of Plan

## 2019-07-01 NOTE — PROGRESS NOTES
Problem: Mobility Impaired (Adult and Pediatric)  Goal: *Acute Goals and Plan of Care (Insert Text)  Description  No goals necessary at this time as pt demonstrates functional mobility with independence. Outcome: Resolved/Met    PHYSICAL THERAPY EVALUATION & DISCHARGE    Patient: Milvia Lopez (42 y.o. female)  Date: 7/1/2019  Primary Diagnosis: Bilateral pulmonary embolism (HCC) [I26.99]  Chest pain [R07.9]  Precautions:  ASSESSMENT AND RECOMMENDATIONS:  Based on the objective data described below, the patient presents with ability to perform functional mobility tasks at independence level without AD, including ambulation level surface 350ft and negotiation of flight of steps with L HR ascending/R HR descending (as at home). No gait deviations noted. O2 sat 98% pre, 97% post activity. Pt left up in chair at bedside with all needs in reach. No further skilled PT intervention indicated. Note pt was visiting area from [place] with spouse and grandchildren when current episode occurred. Discussed pacing self  and building additional rest breaks into drive back home - at  perhaps 2 hr intervals, as well as changing to 2 day trip, vs 1 day. Nurse Coleman Loving notified of above. Skilled physical therapy is not indicated at this time. Discharge Recommendations: None  Further Equipment Recommendations for Discharge: N/A      SUBJECTIVE:   Patient stated ? I'm doing fine. ?    OBJECTIVE DATA SUMMARY:     Past Medical History:   Diagnosis Date    Acid reflux     Kidney stones      Past Surgical History:   Procedure Laterality Date    HX COLONOSCOPY      HX LITHOTRIPSY      HX TONSILLECTOMY      HX TUBAL LIGATION       Barriers to Learning/Limitations: None  Compensate with: visual, verbal, tactile, kinesthetic cues/model  Prior Level of Function/Home Situation: independent w/o AD PTA; was here visiting area from [address] [place] [address] Place: Private residence  Wheelchair Ramp: Yes  One/Two Story Residence: Two story  # of Interior Steps: 12(with landing midway)  Interior Rails: Left(ascending)  Living Alone: No  Support Systems: Spouse/Significant Other/Partner  Patient Expects to be Discharged to[de-identified] Private residence  Current DME Used/Available at Home: None  Tub or Shower Type: Tub/Shower combination  Critical Behavior:  Neurologic State: Appropriate for age  Orientation Level: Oriented X4  Cognition: Appropriate safety awareness; Appropriate for age attention/concentration; Appropriate decision making  Safety/Judgement: Awareness of environment; Fall prevention  Psychosocial  Patient Behaviors: Calm; Cooperative  Purposeful Interaction: Yes  Pt Identified Daily Priority: Clinical issues (comment)  Katy Process: Establish trust  Caring Interventions: Reassure  Reassure: Informing  Strength:    Strength: Within functional limits  Tone & Sensation:   Tone: Normal  Sensation: Intact  Range Of Motion:  AROM: Within functional limits  Functional Mobility:  Bed Mobility:  Transfers:  Sit to Stand: Independent  Stand to Sit: Independent  Bed to Chair: Independent  Balance:   Sitting: Intact  Standing: Intact; Without support  Ambulation/Gait Training:  Distance (ft): 350 Feet (ft)  Assistive Device: Gait belt  Ambulation - Level of Assistance: Independent  Gait Description (WDL): Exceptions to WDL  Interventions: Safety awareness training  Pain:  Pain Scale 1: Numeric (0 - 10)  Pain Intensity 1: 0  Activity Tolerance:   Good   Please refer to the flowsheet for vital signs taken during this treatment. After treatment:   ?         Patient left in no apparent distress sitting up in chair  ? Patient left in no apparent distress in bed  ? Call bell left within reach  ? Nursing notified-Gus  ? Caregiver present  ? Bed alarm activated    COMMUNICATION/EDUCATION:   ?         Fall prevention education was provided and the patient/caregiver indicated understanding.   ? Patient/family have participated as able in goal setting and plan of care. ?         Patient/family agree to work toward stated goals and plan of care. ?         Patient understands intent and goals of therapy, but is neutral about his/her participation. ? Patient is unable to participate in goal setting and plan of care.     Eval Complexity: History: MEDIUM  Complexity : 1-2 comorbidities / personal factors will impact the outcome/ POC Exam:LOW Complexity : 1-2 Standardized tests and measures addressing body structure, function, activity limitation and / or participation in recreation  Presentation: LOW Complexity : Stable, uncomplicated  Clinical Decision Making:Low Complexity amb 350ft with independence, no deviations  Overall Complexity:LOW     Thank you for this referral.  Sherif White, PT   Time Calculation: 23 mins

## 2019-07-02 VITALS
BODY MASS INDEX: 31.63 KG/M2 | HEIGHT: 67 IN | DIASTOLIC BLOOD PRESSURE: 70 MMHG | TEMPERATURE: 97.9 F | SYSTOLIC BLOOD PRESSURE: 107 MMHG | OXYGEN SATURATION: 98 % | RESPIRATION RATE: 16 BRPM | WEIGHT: 201.5 LBS | HEART RATE: 79 BPM

## 2019-07-02 LAB
ANION GAP SERPL CALC-SCNC: 6 MMOL/L (ref 3–18)
BUN SERPL-MCNC: 19 MG/DL (ref 7–18)
BUN/CREAT SERPL: 22 (ref 12–20)
CALCIUM SERPL-MCNC: 8.4 MG/DL (ref 8.5–10.1)
CHLORIDE SERPL-SCNC: 107 MMOL/L (ref 100–108)
CO2 SERPL-SCNC: 29 MMOL/L (ref 21–32)
CREAT SERPL-MCNC: 0.87 MG/DL (ref 0.6–1.3)
ERYTHROCYTE [DISTWIDTH] IN BLOOD BY AUTOMATED COUNT: 14.1 % (ref 11.6–14.5)
GLUCOSE SERPL-MCNC: 104 MG/DL (ref 74–99)
HCT VFR BLD AUTO: 43.4 % (ref 35–45)
HGB BLD-MCNC: 13.9 G/DL (ref 12–16)
MCH RBC QN AUTO: 30 PG (ref 24–34)
MCHC RBC AUTO-ENTMCNC: 32 G/DL (ref 31–37)
MCV RBC AUTO: 93.7 FL (ref 74–97)
PLATELET # BLD AUTO: 176 K/UL (ref 135–420)
PMV BLD AUTO: 10.3 FL (ref 9.2–11.8)
POTASSIUM SERPL-SCNC: 3.9 MMOL/L (ref 3.5–5.5)
RBC # BLD AUTO: 4.63 M/UL (ref 4.2–5.3)
SODIUM SERPL-SCNC: 142 MMOL/L (ref 136–145)
WBC # BLD AUTO: 4.9 K/UL (ref 4.6–13.2)

## 2019-07-02 PROCEDURE — 80048 BASIC METABOLIC PNL TOTAL CA: CPT

## 2019-07-02 PROCEDURE — 74011250637 HC RX REV CODE- 250/637: Performed by: HOSPITALIST

## 2019-07-02 PROCEDURE — 85027 COMPLETE CBC AUTOMATED: CPT

## 2019-07-02 PROCEDURE — 74011250636 HC RX REV CODE- 250/636: Performed by: PHYSICIAN ASSISTANT

## 2019-07-02 PROCEDURE — 36415 COLL VENOUS BLD VENIPUNCTURE: CPT

## 2019-07-02 RX ADMIN — ENOXAPARIN SODIUM 90 MG: 120 INJECTION SUBCUTANEOUS at 08:15

## 2019-07-02 RX ADMIN — ASPIRIN 81 MG: 81 TABLET, COATED ORAL at 08:14

## 2019-07-02 NOTE — DISCHARGE INSTRUCTIONS
Patient Education        Pulmonary Embolism: Care Instructions  Your Care Instructions    Pulmonary embolism is the sudden blockage of an artery in the lung. Blood clots in the deep veins of the leg or pelvis (deep vein thrombosis, or DVT) are the most common cause. These blood clots can travel to the lungs. Pulmonary embolism can be very serious. Because you have had one pulmonary embolism, you are at greater risk for having another one. But you can take steps to prevent another pulmonary embolism by following your doctor's instructions. You will probably take a prescription blood-thinning medicine to prevent blood clots. A blood thinner can stop a blood clot from growing larger and prevent new clots from forming. Follow-up care is a key part of your treatment and safety. Be sure to make and go to all appointments, and call your doctor if you are having problems. It's also a good idea to know your test results and keep a list of the medicines you take. How can you care for yourself at home? · Take your medicines exactly as prescribed. Call your doctor if you think you are having a problem with your medicine. You will get more details on the specific medicines your doctor prescribes. · If you are taking a blood thinner, be sure you get instructions about how to take your medicine safely. Blood thinners can cause serious bleeding problems. Preventing future pulmonary embolisms  · Exercise. Keep blood moving in your legs to keep clots from forming. If you are traveling by car, stop every hour or so. Get out and walk around for a few minutes. If you are traveling by bus, train, or plane, get out of your seat and walk up and down the aisles every hour or so. You also can do leg exercises while you are seated. Pump your feet up and down by pulling your toes up toward your knees then pointing them down. · Get up out of bed as soon as possible after an illness or surgery. · Do not smoke.  If you need help quitting, talk to your doctor about stop-smoking programs and medicines. These can increase your chances of quitting for good. · Check with your doctor before taking hormone or birth control pills. These may increase your risk of blot clots. · Ask your doctor about wearing compression stockings to help prevent blood clots in your legs. There are different types of stockings, and they need to fit right. So your doctor will recommend what you need. When should you call for help? Call 911 anytime you think you may need emergency care. For example, call if:    · You have shortness of breath.     · You have chest pain.     · You passed out (lost consciousness).     · You cough up blood.    Call your doctor now or seek immediate medical care if:    · You have new or worsening pain or swelling in your leg.    Watch closely for changes in your health, and be sure to contact your doctor if:    · You do not get better as expected. Where can you learn more? Go to http://arben-leonard.info/. Enter D108 in the search box to learn more about \"Pulmonary Embolism: Care Instructions. \"  Current as of: September 26, 2018  Content Version: 11.9  © 8962-5044 [employer]. Care instructions adapted under license by [employer] (which disclaims liability or warranty for this information). If you have questions about a medical condition or this instruction, always ask your healthcare professional. [address] any warranty or liability for your use of this information.

## 2019-07-02 NOTE — PROGRESS NOTES
1928 report received from Chase Streeter. Pt sitting on side of bed. No distress note and pt denies pain    2000- pt request to take her own medications from home.  RN verified medications that pt has with meds that are in pt [facility]    3927 report given to Suzanne Adams RN

## 2019-07-02 NOTE — DISCHARGE SUMMARY
Discharge Summary    Patient: Jenny Wynn MRN: [id number]  CSN: [id number]    YOB: 1945  Age: 75 y.o. Sex: female    DOA: 6/30/2019 LOS:  LOS: 2 days   Discharge Date:      Primary Care Provider:  Other, MD Yulia    Admission Diagnoses: Bilateral pulmonary embol[facility] ([facility] [I26.99]  Chest pain [R07.9]    Discharge Diagnoses:    Problem List as of 7/2/2019 Never Reviewed          Codes Class Noted - Resolved    Chest pain ICD-10-CM: R07.9  ICD-9-CM: 786.50  6/30/2019 - Present        Bilateral pulmonary embolism ([facility] ICD-10-CM: I26.99  ICD-9-CM: 415.19  6/30/2019 - Present              Discharge Medications:     Current Discharge Medication List      START taking these medications    Details   rivaroxaban (XARELTO) 15 mg (42)- 20 mg (9) DsPk Take one 15 mg tablet twice a day with food for the first 21 days. Then, take one 20 mg tablet once a day with food for 9 days. Qty: 1 Dose Pack, Refills: 0         CONTINUE these medications which have NOT CHANGED    Details   vit C/E/Zn/coppr/lutein/zeaxan (PRESERVISION AREDS-2 PO) Take  by mouth daily. venlafaxine (EFFEXOR) 37.5 mg tablet Take 37.5 mg by mouth daily. calcium citrate-vitamin d3 (CITRACAL+D) 315-200 mg-unit tab Take 1 Tab by mouth two (2) times daily (with meals). multivitamin (ONE A DAY) tablet Take 1 Tab by mouth daily. Biotin 2,500 mcg cap Take  by mouth daily. aspirin delayed-release 81 mg tablet Take 81 mg by mouth daily. vit B6-mag cit,oxid-potass cit (THERALITH XR) 3.75-45-45-49.5 mg TbER Take 1 Tab by mouth two (2) times a day. omeprazole (PRILOSEC) 40 mg capsule Take 40 mg by mouth daily. mirabegron ER (MYRBETRIQ) 50 mg ER tablet Take 50 mg by mouth daily. loratadine (CLARITIN) 10 mg tablet Take 10 mg by mouth daily. azelastine (ASTEPRO) 0.15 % (205.5 mcg) 2 Sprays by Both Nostrils route as needed. fluticasone propionate 250 mcg/actuation dsdv Take  by inhalation as needed. Discharge Condition: Good          PHYSICAL EXAM   Visit Vitals  /67   Pulse 67   Temp 97.3 °F (36.3 °C)   Resp 16   Ht 5' 7\" (1.702 m)   Wt 91.4 kg (201 lb 8 oz)   SpO2 100%   BMI 31.56 kg/m²     General: Awake, cooperative, no acute distress    HEENT: NC, Atraumatic. PERRLA, EOMI. Anicteric sclerae. Lungs:  CTA Bilaterally. No Wheezing/Rhonchi/Rales. Heart:  Regular  rhythm,  No murmur, No Rubs, No Gallops  Abdomen: Soft, Non distended, Non tender. +Bowel sounds,   Extremities: No c/c/e  Psych:   Not anxious or agitated. Neurologic:  No acute neurological deficits. Admission HPI :   Daisy Hernandez is a 75 y.o. female who has no significant past history except for kidney stones, presents to ER with concerns of chest pain. Patient reports that she was walking at [facility] when she felt chest pain that was sharp located center of chest. Not associated with SOB or diaphoresis. It lasted about 5 mins. Denies any syncopal episode, leg swelling or pain. She is visiting this area and she came from [place], [place]. She had long 7-8 hours drive 4 days ago. She reports that 2 weeks ago she drove to [place], [place] and at that time she had similar chest pain but did not seek medical attention. In ER CT chest with bilateral PE, no right heart strain. Hospital Course :   Ms. Maty Ann was admitted to the telemetry. She was started on therapeutic dose of lovenox. She did not had any acute events during hospitalization. Her echo showed normal LVF, right ventricle normal size and function. EF of 56-6-%. LE dopplers with no DVT. She is walking down the [place]illy with no SOB. Feels much better and eager to go home.      Activity: Activity as tolerated    Diet: Regular Diet    Follow-up: PCP    Disposition: home    Minutes spent on discharge: 45       Labs: Results:       Chemistry Recent Labs     07/02/19  0310 07/01/19  0106 06/30/19  1730   * 113* 96    141 143 K 3.9 3.8 4.4    108 110*   CO2 29 28 30   BUN 19* 21* 25*   CREA 0.87 0.92 1.04   CA 8.4* 8.7 9.2   AGAP 6 5 3   BUCR 22* 23* 24*   AP  --   --  81   TP  --   --  6.6   ALB  --   --  3.7   GLOB  --   --  2.9   AGRAT  --   --  1.3      CBC w/Diff Recent Labs     07/02/19  0310 07/01/19  0106 06/30/19  1730   WBC 4.9 5.3 5.9   RBC 4.63 4.49 4.53   HGB 13.9 13.3 13.7   HCT 43.4 42.0 42.5    189 191   GRANS  --   --  59   LYMPH  --   --  25   EOS  --   --  4      Cardiac Enzymes Recent Labs     06/30/19  1730   CPK 71   CKND1 2.5      Coagulation No results for input(s): PTP, INR, APTT in the last 72 hours. No lab exists for component: INREXT    Lipid Panel No results found for: CHOL, CHOLPOCT, CHOLX, CHLST, CHOLV, 256344, HDL, LDL, LDLC, DLDLP, 074853, VLDLC, VLDL, TGLX, TRIGL, TRIGP, TGLPOCT, CHHD, CHHDX   BNP No results for input(s): BNPP in the last 72 hours. Liver Enzymes Recent Labs     06/30/19  1730   TP 6.6   ALB 3.7   AP 81   SGOT 22      Thyroid Studies No results found for: T4, T3U, TSH, TSHEXT         Significant Diagnostic Studies: Xr Chest Pa Lat    Result Date: 6/30/2019  EXAM:  PA and Lateral Chest X-ray INDICATION: Acid reflux, chest pain COMPARISON: None ___________ FINDINGS: Heart and mediastinal contours are within normal limits. Lungs are clear of active disease. There are no pleural effusions. No acute osseous findings. _____________      IMPRESSION:  No acute process    Cta Chest W Or W Wo Cont    Result Date: 6/30/2019  EXAM: CTA chest INDICATION: Chest pain COMPARISON: None TECHNIQUE: Axial CT imaging from the thoracic inlet through the diaphragm with intravenous contrast. Coronal and sagittal MIP reformats were generated. One or more dose reduction techniques were used on this CT: automated exposure control, adjustment of the mAs and/or kVp according to patient size, and iterative reconstruction techniques.   The specific techniques used on this CT exam have been documented in the patient's electronic medical record. Digital Imaging and Communications in Medicine (DICOM) format image data are available to nonaffiliated external healthcare facilities or entities on a secure, media free, reciprocally searchable basis with patient authorization for at least a 12-month period after this study. _______________ FINDINGS: EXAM QUALITY: Overall exam quality is satisfactory. Pulmonary arterial enhancement is adequate with adequate breath hold and no significant artifact. PULMONARY ARTERIES: There are small bilateral lower lobe pulmonary emboli to the subsegmental pulmonary arteries. Is also small pulmonary emboli to the right upper lobe pulmonary arteries. LYMPH Nodes: No enlarged lymph nodes seen. THYROID GLAND: There is a very large right thyroid lobe nodule measuring 5.4 x 3.1 cm. Advise thyroid ultrasound for further evaluation. It is causing mass effect on the trachea. PLEURA: No pleural effusion seen HEART: Normal without pericardial effusion VASCULATURE/MEDIASTINUM: There is a moderate size hiatal hernia. There is no aortic dissection. There is no right heart strain. Right heart strain indices: RV/LV ratio: Normal (ratio </= 1). Interventricular septal bowing: None. Main pulmonary artery diameter: Normal. Contrast reflux into the IVC: None. LUNGS: There is a 2 mm nodule in the left upper lobe on image 23. There is a 2 mm nodule left upper lobe image 22. There is a 1 to 2 mm nodule left lower lobe image 113. There is a 2 mm nodule right upper lobe image 88. No focal airspace disease seen. AIRWAY: Normal. UPPER ABDOMEN: There is a nonobstructive calculus in the upper pole the left kidney measuring 3 mm. Otherwise the visualized solid organs are unremarkable. Moderate size hiatal hernia present. OTHER: No acute or aggressive osseous abnormalities identified.  There is osteopenia and multilevel degenerative disc disease. _______________     IMPRESSION: There are small bilateral lower lobe and right upper lobe pulmonary emboli. There is no right heart strain. Dr. Brandi Snyder informed 9:09 PM June 30, 2019. No aortic dissection. Small hiatal hernia. Large right thyroid lobe nodule. Advise thyroid ultrasound on a nonemergent basis. Less than 5 mm multiple pulmonary nodules. Follow-up as per Fleischner Society protocol. ======== Fleischner Society Pulmonary Nodule Guidelines (revised 2017): Multiple solid nodules less than 6 mm: -Low risk for lung cancer: No followup. -High risk for lung cancer: Optional chest CT in 12 months. No results found for this or any previous visit.         CC: Ama[place], MD Yulia

## 2019-07-02 NOTE — PROGRESS NOTES
Problem: Falls - Risk of  Goal: *Absence of Falls  Description  Document Denise Girt Fall Risk and appropriate interventions in the flowsheet.   Outcome: Progressing Towards Goal